# Patient Record
Sex: MALE | Race: BLACK OR AFRICAN AMERICAN | Employment: UNEMPLOYED | URBAN - METROPOLITAN AREA
[De-identification: names, ages, dates, MRNs, and addresses within clinical notes are randomized per-mention and may not be internally consistent; named-entity substitution may affect disease eponyms.]

---

## 2019-10-16 ENCOUNTER — HOSPITAL ENCOUNTER (EMERGENCY)
Age: 59
Discharge: HOME | End: 2019-10-16
Attending: EMERGENCY MEDICINE
Payer: MEDICAID

## 2019-10-16 VITALS
BODY MASS INDEX: 27.26 KG/M2 | HEART RATE: 62 BPM | OXYGEN SATURATION: 100 % | WEIGHT: 205.69 LBS | TEMPERATURE: 97.7 F | RESPIRATION RATE: 17 BRPM | SYSTOLIC BLOOD PRESSURE: 182 MMHG | DIASTOLIC BLOOD PRESSURE: 105 MMHG | HEIGHT: 73 IN

## 2019-10-16 DIAGNOSIS — M54.31 SCIATICA, RIGHT SIDE: Primary | ICD-10-CM

## 2019-10-16 DIAGNOSIS — R03.0 ELEVATED BLOOD PRESSURE READING: ICD-10-CM

## 2019-10-16 PROCEDURE — 99282 EMERGENCY DEPT VISIT SF MDM: CPT

## 2019-10-16 RX ORDER — OXYCODONE AND ACETAMINOPHEN 5; 325 MG/1; MG/1
1 TABLET ORAL
Qty: 15 TAB | Refills: 0 | Status: SHIPPED | OUTPATIENT
Start: 2019-10-16 | End: 2019-10-21

## 2019-10-16 RX ORDER — METHYLPREDNISOLONE 4 MG/1
TABLET ORAL
Qty: 1 DOSE PACK | Refills: 0 | Status: SHIPPED | OUTPATIENT
Start: 2019-10-16

## 2019-10-16 RX ORDER — METHOCARBAMOL 750 MG/1
750 TABLET, FILM COATED ORAL 4 TIMES DAILY
Qty: 30 TAB | Refills: 0 | Status: SHIPPED | OUTPATIENT
Start: 2019-10-16

## 2019-10-16 NOTE — DISCHARGE INSTRUCTIONS
Thank you for allowing us to take care of you today! We hope we addressed all of your concerns and needs. We strive to provide excellent quality care in the Emergency Department. You will receive a survey after your visit to evaluate the care you were provided. Should you receive a survey from us, we invite you to share your experience and tell us what made it excellent. It was a pleasure serving you, we invite you to share your experience with us, in our pursuit for excellence, should you be selected to receive a survey. The exam and treatment you received in the Emergency Department were for an urgent problem and are not intended as complete care. It is important that you follow up with a doctor, nurse practitioner, or physician assistant for ongoing care. If your symptoms become worse or you do not improve as expected and you are unable to reach your usual health care provider, you should return to the Emergency Department. We are available 24 hours a day. Please take your discharge instructions with you when you go to your follow-up appointment. If you have any problem arranging a follow-up appointment, contact the Emergency Department immediately. If a prescription has been provided, please have it filled as soon as possible to prevent a delay in treatment. Read the entire medication instruction sheet provided to you by the pharmacy. If you have any questions or reservations about taking the medication due to side effects or interactions with other medications, please call your primary care physician or contact the ER to speak with the charge nurse. Make an appointment with your family doctor or the physician you were referred to for follow-up of this visit as instructed on your discharge paperwork, as this is mandatory follow-up. Return to the ER if you are unable to be seen or if you are unable to be seen in a timely manner.     If you have any problem arranging the follow-up visit, contact the Emergency Department immediately. I hope you feel better and thank you again for allow us to provide you with excellent care today at [facility]! Warmest regards,    Bijal Triplett PA-C  Emergency Medicine Physician Assistant  [facility]      Vitals:    10/16/19 1428 10/16/19 1525   BP: (!) 230/113 (!) 182/105   BP 1 Location: Right arm    BP Patient Position: At rest    Pulse: 69 62   Resp: 17    Temp: 97.7 °F (36.5 °C)    SpO2: 100%    Weight: 93.3 kg (205 lb 11 oz)    Height: 6' 1\" (1.854 m)        No results found for this or any previous visit (from the past 12 hour(s)). No orders to display     CT Results  (Last 48 hours)    None            [facility] Departments     For adult and child immunizations, family planning, TB screening, STD testing and women's health services. [facility]: [place] [phone]      [facility] 25   657 [facility]   [address]   170 [facility]: [facility] 200 [facility] [phone]      2403 [facility]          [address] [place] [address]     For primary care services, woman and child wellness, and some clinics providing specialty care. U -- [address] [phone]   411 [facility] 200 [facility] [address] [place] [address] [phone]   [address] [place], [place] [address]usseestr. [address] [place] [address] [phone]   [address] [place] [address] [address] [place] [address]  [phone]   [address] [place] [place] [address] [phone]   [facility] 81 [facility] [phone]   [address] [phone]   [facility]: [address] [phone], ext Sunita [address] [place], [place] [phone]     [address] [facility] [phone]   [facility] Outreach [place] [address] [address] [phone]   [facility]  [address], [place] [address] ([email or web address]) [phone]         Sexual Health/Woman Wellness Clinics    For STD/HIV testing and treatment, pregnancy testing and services, men's health, birth control services, LGBT services, and hepatitis/HPV vaccine services. [facility] for [place] [employer] 201 [address]. [facility] 75 [facility] 1579 [address] [phone]   [facility] [address], 5th floor [phone]   Pregnancy [address] [place] [address] [facility] [address]  [place] [phone]         [place] 9919 [facility] [address] [place] [address]   [phone]   [place]   [phone]   Women, Infant and Children's Services: [address] [phone]       600 [facility] Crisis Intervention   [phone]   [address]   [phone]   [facility] Psychiat[facility]     [phone]   [address] Crisis   [phone]   [facility] [phone]       Local Primary Care Physicians  64 [facility] [phone]  MD Rosalva Patiño MD Janalyn Cashing, MD [facility] [phone]  Nunu Brewster, MD Yony Francisco MD Smith Lazier, MD [address]  [phone]  MD Hola Smith MD [address] [facility] [phone]  MD Travis Darby MD Sandy Spalding, MD Larene Moulder, MD   [facility] [phone]  [facility] MD Ita DELGADILLO MD Maud Agreste, NP [address] [facility] [address] [phone]  Marja Halim, MD Macie Seton, MD Twanna Gola, MD Dyanna Merlin, MD Patrice Beaver, MD Burnie Harrier MD Belkis Rob MD   [facility] [phone]  Peter Sandoval MD [facility] [phone]  MD Benjie hWalen, NP  Omari Ly, MD Shyanne Salmeron MD Felix Piedra, MD Jodie Docker, MD   9542 [facility] [phone]  Jerry Barba, MD Maday Daily, SURAJ Brar, GERMAN Romano, MD Servando Adhikari, MD Dayanna Valera, MD Idalia Lopez MD [facility] [phone]  Karen Fontaine, MD Chico Don, MD Tg Edwards, MD Lawrence Lemus, MD Sarthak Thayer MD   [address] [phone]  Cramen Johnston, MD Santana Mukherjee [phone]  Bruce Morgan, MD Lewis Subramanian, MD Valeriano Paul MD   [facility] [phone]  Jean Carlos Collier, MD  [facility], MD Ana Maria Cox, MD Jamari Narvaez, MD Estevan Carranza, MD Tereza Mittal, GERMAN Madrid MD [facility]9 [facility]   [phone]  MD Noel Lorenz MD Jhonny Jurist, MD   2102 [facility] [phone]  MD Lise Monk, [facility]  LAURA Perez, [facility]  LAURA Mendez MD Alric Mungo, GERMAN Bhagat, DO Miscellaneous:  Karen Triplett [phone]       Patient Education        Elevated Blood Pressure: Care Instructions  Your Care Instructions    Blood pressure is a measure of how hard the blood pushes against the walls of your arteries. It's normal for blood pressure to go up and down throughout the day. But if it stays up over time, you have high blood pressure. Two numbers tell you your blood pressure. The first number is the systolic pressure. It shows how hard the blood pushes when your heart is pumping. The second number is the diastolic pressure.  It shows how hard the blood pushes between heartbeats, when your heart is relaxed and filling with blood. An ideal blood pressure in adults is less than 120/80 (say \"120 over 80\"). High blood pressure is 140/90 or higher. You have high blood pressure if your top number is 140 or higher or your bottom number is 90 or higher, or both. The main test for high blood pressure is simple, fast, and painless. To diagnose high blood pressure, your doctor will test your blood pressure at different times. After testing your blood pressure, your doctor may ask you to test it again when you are home. If you are diagnosed with high blood pressure, you can work with your doctor to make a long-term plan to manage it. Follow-up care is a key part of your treatment and safety. Be sure to make and go to all appointments, and call your doctor if you are having problems. It's also a good idea to know your test results and keep a list of the medicines you take. How can you care for yourself at home? · Do not smoke. Smoking increases your risk for heart attack and stroke. If you need help quitting, talk to your doctor about stop-smoking programs and medicines. These can increase your chances of quitting for good. · Stay at a healthy weight. · Try to limit how much sodium you eat to less than 2,300 milligrams (mg) a day. Your doctor may ask you to try to eat less than 1,500 mg a day. · Be physically active. Get at least 30 minutes of exercise on most days of the week. Walking is a good choice. You also may want to do other activities, such as running, swimming, cycling, or playing tennis or team sports. · Avoid or limit alcohol. Talk to your doctor about whether you can drink any alcohol. · Eat plenty of fruits, vegetables, and low-fat dairy products. Eat less saturated and total fats. · Learn how to check your blood pressure at home. When should you call for help?   Call your doctor now or seek immediate medical care if:  ? · Your blood pressure is much higher than normal (such as 180/110 or higher). ? · You think high blood pressure is causing symptoms such as:  ¨ Severe headache. ¨ Blurry vision. ? Watch closely for changes in your health, and be sure to contact your doctor if:  ? · You do not get better as expected. Where can you learn more? Go to http://isis-radha.info/. Enter I939 in the search box to learn more about \"Elevated Blood Pressure: Care Instructions. \"  Current as of: September 21, 2016  Content Version: 11.4  © 7793-5886 [employer]. Care instructions adapted under license by [employer] (which disclaims liability or warranty for this information). If you have questions about a medical condition or this instruction, always ask your healthcare professional. [place] [address] any warranty or liability for your use of this information. Patient Education        Back Pain, Emergency or Urgent Symptoms: Care Instructions  Your Care Instructions    Many people have back pain at one time or another. In most cases, pain gets better with self-care that includes over-the-counter pain medicine, ice, heat, and exercises. Unless you have symptoms of a severe injury or heart attack, you may be able to give yourself a few days before you call a doctor. But some back problems are very serious. Do not ignore symptoms that need to be checked right away. Follow-up care is a key part of your treatment and safety. Be sure to make and go to all appointments, and call your doctor if you are having problems. It's also a good idea to know your test results and keep a list of the medicines you take. How can you care for yourself at home? · Sit or lie in positions that are most comfortable and that reduce your pain. Try one of these positions when you lie down:  ? Lie on your back with your knees bent and supported by large pillows. ? Lie on the floor with your legs on the seat of a sofa or chair.   ? Lie on your side with your knees and hips bent and a pillow between your legs. ? Lie on your stomach if it does not make pain worse. · Do not sit up in bed, and avoid soft couches and twisted positions. Bed rest can help relieve pain at first, but it delays healing. Avoid bed rest after the first day. · Change positions every 30 minutes. If you must sit for long periods of time, take breaks from sitting. Get up and walk around, or lie flat. · Try using a heating pad on a low or medium setting, for 15 to 20 minutes every 2 or 3 hours. Try a warm shower in place of one session with the heating pad. You can also buy single-use heat wraps that last up to 8 hours. You can also try ice or cold packs on your back for 10 to 20 minutes at a time, several times a day. (Put a thin cloth between the ice pack and your skin.) This reduces pain and makes it easier to be active and exercise. · Take pain medicines exactly as directed. ? If the doctor gave you a prescription medicine for pain, take it as prescribed. ? If you are not taking a prescription pain medicine, ask your doctor if you can take an over-the-counter medicine. When should you call for help? Call 911 anytime you think you may need emergency care. For example, call if:    · You are unable to move a leg at all.     · You have back pain with severe belly pain.     · You have symptoms of a heart attack. These may include:  ? Chest pain or pressure, or a strange feeling in the chest.  ? Sweating. ? Shortness of breath. ? Nausea or vomiting. ? Pain, pressure, or a strange feeling in the back, neck, jaw, or upper belly or in one or both shoulders or arms. ? Lightheadedness or sudden weakness. ? A fast or irregular heartbeat. After you call 911, the  may tell you to chew 1 adult-strength or 2 to 4 low-dose aspirin. Wait for an ambulance.  Do not try to drive yourself.    Call your doctor now or seek immediate medical care if:    · You have new or worse symptoms in your arms, legs, chest, belly, or buttocks. Symptoms may include:  ? Numbness or tingling. ? Weakness. ? Pain.     · You lose bladder or bowel control.     · You have back pain and:  ? You have injured your back while lifting or doing some other activity. Call if the pain is severe, has not gone away after 1 or 2 days, and you cannot do your normal daily activities. ? You have had a back injury before that needed treatment. ? Your pain has lasted longer than 4 weeks. ? You have had weight loss you cannot explain. ? You have a fever. ? You are age 48 or older. ? You have cancer now or have had it before.    Watch closely for changes in your health, and be sure to contact your doctor if you are not getting better as expected. Where can you learn more? Go to http://isis-radha.info/. Enter E817 in the search box to learn more about \"Back Pain, Emergency or Urgent Symptoms: Care Instructions. \"  Current as of: June 26, 2019  Content Version: 12.2  © 2339-9101 [employer]. Care instructions adapted under license by [employer] (which disclaims liability or warranty for this information). If you have questions about a medical condition or this instruction, always ask your healthcare professional. [place] [address] any warranty or liability for your use of this information. Patient Education        Sciatica: Exercises  Introduction  Here are some examples of typical rehabilitation exercises for your condition. Start each exercise slowly. Ease off the exercise if you start to have pain. Your doctor or physical therapist will tell you when you can start these exercises and which ones will work best for you. When you are not being active, find a comfortable position for rest. Some people are comfortable on the floor or a medium-firm bed with a small pillow under their head and another under their knees.  Some people prefer to lie on their side with a pillow between their knees. Don't stay in one position for too long. Take short walks (10 to 20 minutes) every 2 to 3 hours. Avoid slopes, hills, and stairs until you feel better. Walk only distances you can manage without pain, especially leg pain. How to do the exercises  Back stretches    1. Get down on your hands and knees on the floor. 2. Relax your head and allow it to droop. Round your back up toward the ceiling until you feel a nice stretch in your upper, middle, and lower back. Hold this stretch for as long as it feels comfortable, or about 15 to 30 seconds. 3. Return to the starting position with a flat back while you are on your hands and knees. 4. Let your back sway by pressing your stomach toward the floor. Lift your buttocks toward the ceiling. 5. Hold this position for 15 to 30 seconds. 6. Repeat 2 to 4 times. Follow-up care is a key part of your treatment and safety. Be sure to make and go to all appointments, and call your doctor if you are having problems. It's also a good idea to know your test results and keep a list of the medicines you take. Where can you learn more? Go to http://isisRoundscapesradha.info/. Enter H948 in the search box to learn more about \"Sciatica: Exercises. \"  Current as of: June 26, 2019  Content Version: 12.2  © 1313-2101 [employer]. Care instructions adapted under license by [employer] (which disclaims liability or warranty for this information). If you have questions about a medical condition or this instruction, always ask your healthcare professional. [place] [address] any warranty or liability for your use of this information. Patient Education        Sciatica: Care Instructions  Your Care Instructions    Sciatica (say \"rav-NN-tm-kuh\") is an irritation of one of the sciatic nerves, which come from the spinal cord in the lower back.  The sciatic nerves and their branches extend down through the buttock to the foot. Sciatica can develop when an injured disc in the back presses against a spinal nerve root. Its main symptom is pain, numbness, or weakness that is often worse in the leg or foot than in the back. Sciatica often will improve and go away with time. Early treatment usually includes medicines and exercises to relieve pain. Follow-up care is a key part of your treatment and safety. Be sure to make and go to all appointments, and call your doctor if you are having problems. It's also a good idea to know your test results and keep a list of the medicines you take. How can you care for yourself at home? · Take pain medicines exactly as directed. ? If the doctor gave you a prescription medicine for pain, take it as prescribed. ? If you are not taking a prescription pain medicine, ask your doctor if you can take an over-the-counter medicine. · Use heat or ice to relieve pain. ? To apply heat, put a warm water bottle, heating pad set on low, or warm cloth on your back. Do not go to sleep with a heating pad on your skin. ? To use ice, put ice or a cold pack on the area for 10 to 20 minutes at a time. Put a thin cloth between the ice and your skin. · Avoid sitting if possible, unless it feels better than standing. · Alternate lying down with short walks. Increase your walking distance as you are able to without making your symptoms worse. · Do not do anything that makes your symptoms worse. When should you call for help? Call 911 anytime you think you may need emergency care. For example, call if:    · You are unable to move a leg at all.   [facility] your doctor now or seek immediate medical care if:    · You have new or worse symptoms in your legs or buttocks. Symptoms may include:  ? Numbness or tingling. ? Weakness. ? Pain.     · You lose bladder or bowel control.    Watch closely for changes in your health, and be sure to contact your doctor if:    · You are not getting better as expected.    Where can you learn more? Go to http://isis-radha.info/. Enter [phone] in the search box to learn more about \"Sciatica: Care Instructions. \"  Current as of: June 26, 2019  Content Version: 12.2  © 3347-4230 [employer]. Care instructions adapted under license by [employer] (which disclaims liability or warranty for this information). If you have questions about a medical condition or this instruction, always ask your healthcare professional. [address] any warranty or liability for your use of this information.

## 2019-10-16 NOTE — ED NOTES
Ayala Burch MD   has done a bedside review of the discharge instructions. The patient is in understanding. The patients line(s) are removed. The patient is dressed, and belongings together for discharge.

## 2019-10-16 NOTE — ED PROVIDER NOTES
EMERGENCY DEPARTMENT HISTORY AND PHYSICAL EXAM      Date: 10/16/2019  Patient Name: Nelida Cunningham    History of Presenting Illness     HPI: Nelida Cunningham is a 61 y.o. male with past medical history of chronic lumbar back pain, hypertension, MI in June, DVT, PE, presents to the emergency room for lumbar back pain since 5:30 PM yesterday. Patient reports that his pain started when he was getting up out of a chair and began in his right trochanter area of his hip. He says that the pain at times radiates down to his lateral foot and up to the right side of his lumbar spine. He says that it feels like a electrical feeling, 8 out of 10, positional, intermittent. He denies loss of bladder bowel control, saddle anesthesias, focal weakness, abdominal pain, syncope, among other associated symptoms. Pertinent social history: Current smoker, current drinker, denies street drug use    Pertinent surgical history: Lumbar spine surgery  PCP: None    Current Outpatient Medications   Medication Sig Dispense Refill    oxyCODONE-acetaminophen (PERCOCET) 5-325 mg per tablet Take 1 Tab by mouth every eight (8) hours as needed for Pain for up to 5 days. Max Daily Amount: 3 Tabs. Indications: pain 15 Tab 0    methylPREDNISolone (MEDROL, YOUNG,) 4 mg tablet Dosepak taper 1 Dose Pack 0    methocarbamol (ROBAXIN) 750 mg tablet Take 1 Tab by mouth four (4) times daily. 30 Tab 0    lisinopril (PRINIVIL, ZESTRIL) 20 mg tablet TAKE ONE TABLET BY MOUTH EVERY DAY 30 tablet 6    atenolol-chlorthalidone (TENORETIC 50) 50-25 mg per tablet Take 1 Tab by mouth daily. 30 Tab 6    pravastatin (PRAVACHOL) 40 mg tablet Take 1 Tab by mouth nightly. 30 Tab 6    aspirin delayed-release 81 mg tablet Take 1 Tab by mouth daily. 60 Tab 3    varenicline (CHANTIX STARTER YOUNG) Per Dose pack instructions 1 Package 0    gabapentin (NEURONTIN) 300 mg capsule Take 300 mg by mouth nightly.          Past History     Past Medical History:  Past Medical History:   Diagnosis Date    Chronic pain     back pain    GERD (gastroesophageal reflux disease)     Hypertension     RAN OUT OF BP MEDS 6/2012-TO START TENORETIC 8/23/12    Other ill-defined conditions(799.89)     CHEST PRESSURE - \"MY VALVES ARE SWOLLEN\", PT STATES; HAS SEEN CARDIOLOGIST    Other ill-defined conditions(799.89)     HAD A FALL,GASPING FOR AIR-PT SUSPECTS MEDS (PAIN & FLEXERIL)    Other ill-defined conditions(799.89)     HAS WOKEN WITH LOUD GASP, SLEEPING ON HIS BACK       Past Surgical History:  Past Surgical History:   Procedure Laterality Date    HX ORTHOPAEDIC      lump on spine removed in 1992    HX ORTHOPAEDIC  X4    EPIDURAL PAIN INJECTIONS       Family History:  Family History   Problem Relation Age of Onset    Stroke Mother     Heart Attack Father     Cancer Father         PROSTATE    Kidney Disease Maternal Grandmother         WAS ON DIALYSIS    Headache Maternal Grandfather     Stroke Paternal Grandmother         ANEURYSM    Cancer Paternal Grandfather         LUNG CA       Social History:  Social History     Tobacco Use    Smoking status: Current Every Day Smoker     Packs/day: 0.50     Years: 35.00     Pack years: 17.50   Substance Use Topics    Alcohol use: Yes     Alcohol/week: 3.3 standard drinks     Types: 4 Cans of beer per week    Drug use: No       Allergies:  No Known Allergies      Review of Systems   Review of Systems   Constitutional: Negative for chills and fever. Respiratory: Negative for shortness of breath. Cardiovascular: Negative for chest pain. Gastrointestinal: Negative for abdominal pain, nausea and vomiting. Musculoskeletal: Positive for back pain. Negative for neck pain. Neurological: Negative for weakness, light-headedness, numbness and headaches.        Physical Exam     Vitals:    10/16/19 1428 10/16/19 1525   BP: (!) 230/113 (!) 182/105   Pulse: 69 62   Resp: 17    Temp: 97.7 °F (36.5 °C)    SpO2: 100%    Weight: 93.3 kg (205 lb 11 oz)    Height: 6' 1\" (1.854 m)      Physical Exam   Constitutional: He is oriented to person, place, and time. He appears well-developed and well-nourished. HENT:   Head: Normocephalic and atraumatic. Cardiovascular: Normal rate, regular rhythm and normal heart sounds. Pulmonary/Chest: Effort normal and breath sounds normal.   Abdominal: Soft. Bowel sounds are normal. He exhibits no distension and no mass. There is no tenderness. There is no rebound and no guarding. Musculoskeletal:   Lumbar spine: Decreased range of motion, surgical scar in lumbar spine area, no tenderness palpation of spine or paraspinal muscles, no focal neuro deficits, positive straight leg raise on right at around 30 degrees, 2+ pedal pulses, neurovascularly intact, no incontinence noted. Neurological: He is alert and oriented to person, place, and time. Skin: Skin is warm and dry. Psychiatric: He has a normal mood and affect. His behavior is normal. Judgment and thought content normal.   Nursing note and vitals reviewed. Diagnostic Study Results     Labs -   No results found for this or any previous visit (from the past 12 hour(s)). Radiologic Studies -   No orders to display     CT Results  (Last 48 hours)    None                Medical Decision Making   I am the first provider for this patient. I reviewed the vital signs, available nursing notes, past medical history, past surgical history, social history    ED Course and Progress notes:   Initial assessment performed. The patients presenting problems have been discussed, and they are in agreement with the care plan formulated and outlined with them. I have encouraged them to ask questions as they arise throughout their visit. Patient was reevaluated several times during their stay and there were no worsening symptoms, on the last re evaluation pt is resting comfortably, and has no new complaints, changes, or physical findings.   The patient has improved and is stable. Procedures:  Procedures    Critical Care Time: none    Vital Signs-Reviewed the patient's vital signs. Vitals:    10/16/19 1428 10/16/19 1525   BP: (!) 230/113 (!) 182/105   BP 1 Location: Right arm    BP Patient Position: At rest    Pulse: 69 62   Resp: 17    Temp: 97.7 °F (36.5 °C)    SpO2: 100%    Weight: 93.3 kg (205 lb 11 oz)    Height: 6' 1\" (1.854 m)        Medications Administered During ED Course  Medications - No data to display    HYPERTENSION COUNSELING  Patient denies chest pain, headache, shortness of breath,  sx's, abd pain. Patient is made aware of their elevated blood pressure and is instructed to follow up this week with their Primary Care for a recheck. Patient is counseled regarding consequences of chronic, uncontrolled hypertension including kidney disease, heart disease, stroke or even death. Patient states their understanding and agrees to follow up this week. Disposition:  D/c home    DISCHARGE NOTE:   I Counseled the patient on diagnosis and care plan. All available lab and imaging results have been reviewed by me and were discussed with the patient, including all incidental findings. The likelihood of other entities in the differential is insufficient to justify any further testing for them. This was explained to the patient. Patient agrees with plan and agrees to follow up with PCP as recommended, or return to the ED immediately if their symptoms worsen. All medications were reviewed with the patient. All of pt's questions and concerns were addressed. The patient was advised that new or worsening symptoms would require further evaluation and should prompt immediate return to the Emergency Department. Discharge instructions have been provided and explained to the patient, along with reasons to return to the ED. Patient voices understanding and is agreeable with the plan for discharge. Patient is ready to go home.     Follow-up Information     Follow up With Specialties Details Why Contact Info    Ramin Turpin DO Internal Medicine Schedule an appointment as soon as possible for a visit To establish care with a primary care provider and follow-up for above diagnosis for today's visit. [address] [facility] [place]  [phone]      [facility] EMERGENCY DEPT Emergency Medicine Go to If symptoms worsen [address] [facility] [address]    Chet Flores MD Neurosurgery Schedule an appointment as soon as possible for a visit For above diagnosis [address] [facility] [address]  [phone]            Discharge Medication List as of 10/16/2019  3:50 PM      START taking these medications    Details   oxyCODONE-acetaminophen (PERCOCET) 5-325 mg per tablet Take 1 Tab by mouth every eight (8) hours as needed for Pain for up to 5 days. Max Daily Amount: 3 Tabs. Indications: pain, Print, Disp-15 Tab, R-0      methylPREDNISolone (MEDROL, YOUNG,) 4 mg tablet Dosepak taper, Print, Disp-1 Dose Pack, R-0         CONTINUE these medications which have CHANGED    Details   methocarbamol (ROBAXIN) 750 mg tablet Take 1 Tab by mouth four (4) times daily. , Print, Disp-30 Tab, R-0         CONTINUE these medications which have NOT CHANGED    Details   lisinopril (PRINIVIL, ZESTRIL) 20 mg tablet TAKE ONE TABLET BY MOUTH EVERY DAY, Normal, Disp-30 tablet, R-6      atenolol-chlorthalidone (TENORETIC 50) 50-25 mg per tablet Take 1 Tab by mouth daily. , Normal, Disp-30 Tab, R-6      pravastatin (PRAVACHOL) 40 mg tablet Take 1 Tab by mouth nightly., Normal, Disp-30 Tab, R-6      aspirin delayed-release 81 mg tablet Take 1 Tab by mouth daily. , Normal, Disp-60 Tab, R-3      varenicline (CHANTIX STARTER YOUNG) Per Dose pack instructions, Normal, Disp-1 Package, R-0      gabapentin (NEURONTIN) 300 mg capsule Take 300 mg by mouth nightly. Historical Med, 300 mg         STOP taking these medications       cyclobenzaprine (FLEXERIL) 10 mg tablet Comments:   Reason for Stopping:               Provider Notes (Medical Decision Making):   DDx:     Differential diagnosis: Piriformis syndrome, osteoarthritis, trochanteric bursitis, IT band syndrome, acute ligamentous injury, muscle strain, disc herniation, degenerative disc disease, low concern for septic joint, epidural compression syndromes, spinal fracture, cancer metastasis, osteomyelitis, discitis, AAA       Diagnosis     Clinical Impression:   1. Sciatica, right side    2. Elevated blood pressure reading        Please note that this dictation was completed with [employer], the computer voice recognition software. Quite often unanticipated grammatical, syntax, homophones, and other interpretive errors are inadvertently transcribed by the computer software. Please disregard these errors. Please excuse any errors that have escaped final proofreading. This note will not be viewable in [address]